# Patient Record
Sex: FEMALE
[De-identification: names, ages, dates, MRNs, and addresses within clinical notes are randomized per-mention and may not be internally consistent; named-entity substitution may affect disease eponyms.]

---

## 2019-12-10 PROBLEM — Z00.00 ENCOUNTER FOR PREVENTIVE HEALTH EXAMINATION: Status: ACTIVE | Noted: 2019-12-10

## 2019-12-11 ENCOUNTER — APPOINTMENT (OUTPATIENT)
Dept: RHEUMATOLOGY | Facility: CLINIC | Age: 34
End: 2019-12-11
Payer: MEDICAID

## 2019-12-11 VITALS
OXYGEN SATURATION: 96 % | BODY MASS INDEX: 27.29 KG/M2 | TEMPERATURE: 98.4 F | SYSTOLIC BLOOD PRESSURE: 119 MMHG | DIASTOLIC BLOOD PRESSURE: 72 MMHG | HEART RATE: 84 BPM | WEIGHT: 148.31 LBS | HEIGHT: 62 IN

## 2019-12-11 DIAGNOSIS — Z87.01 PERSONAL HISTORY OF PNEUMONIA (RECURRENT): ICD-10-CM

## 2019-12-11 DIAGNOSIS — Z82.49 FAMILY HISTORY OF ISCHEMIC HEART DISEASE AND OTHER DISEASES OF THE CIRCULATORY SYSTEM: ICD-10-CM

## 2019-12-11 DIAGNOSIS — R51 HEADACHE: ICD-10-CM

## 2019-12-11 DIAGNOSIS — R76.8 OTHER SPECIFIED ABNORMAL IMMUNOLOGICAL FINDINGS IN SERUM: ICD-10-CM

## 2019-12-11 DIAGNOSIS — Z83.3 FAMILY HISTORY OF DIABETES MELLITUS: ICD-10-CM

## 2019-12-11 DIAGNOSIS — M08.90 JUVENILE ARTHRITIS, UNSPECIFIED, UNSPECIFIED SITE: ICD-10-CM

## 2019-12-11 DIAGNOSIS — Z78.9 OTHER SPECIFIED HEALTH STATUS: ICD-10-CM

## 2019-12-11 DIAGNOSIS — Z80.9 FAMILY HISTORY OF MALIGNANT NEOPLASM, UNSPECIFIED: ICD-10-CM

## 2019-12-11 DIAGNOSIS — M25.50 PAIN IN UNSPECIFIED JOINT: ICD-10-CM

## 2019-12-11 PROCEDURE — 36415 COLL VENOUS BLD VENIPUNCTURE: CPT

## 2019-12-11 PROCEDURE — 99204 OFFICE O/P NEW MOD 45 MIN: CPT | Mod: 25

## 2019-12-11 RX ORDER — SERTRALINE HYDROCHLORIDE 25 MG/1
TABLET, FILM COATED ORAL
Refills: 0 | Status: ACTIVE | COMMUNITY

## 2019-12-11 RX ORDER — BUPROPION HYDROCHLORIDE 100 MG/1
TABLET, FILM COATED ORAL
Refills: 0 | Status: ACTIVE | COMMUNITY

## 2019-12-11 NOTE — HISTORY OF PRESENT ILLNESS
[FreeTextEntry1] : This is a 34-year-old woman she comes with her  she is concerned about fibromyalgia she's also been found to have a positive BRYANT she reports she has joint pain she has pains in her elbows her wrists and her fingers she has periods where she feels she is losing strength in her arms and hands when she is holding a book after while things become very painful and she has to let go she also has been having headaches migraine in nature she is bothered by light chills has dryness of her eyes nonetheless she is able to wear it contacts she uses eyedrops she is able to make tears her symptoms date back for over 20 years she also has lower extremity pain in the ankles she has chronic neck pain she reports that as a child 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 She is wondering whether or not she should be given Cymbalta at a different medicine. She denies any significant rashes or sun sensitive rashes she has had no involuntary weight loss she had been going to the gym in the past but stopped this about 2 months ago and apparently she was diagnosed as having mono she also has poor sleeping habits she is a tired she does not have any breathing problems no coughing or shortness of breath she has no GI problems no nausea or vomiting or diarrhea her. She's actually had no recent swelling of any joints she's had 3 pregnancies with anemia and aware of any significant family history of any arthritic issues.

## 2019-12-11 NOTE — ASSESSMENT
[FreeTextEntry1] : 3 for her woman long-standing history of musculoskeletal complaints dating back into childhood in fact at one point was told that she may have a juvenile form of arthritis. She recalls being treated with some medications and braces although she cannot recall specifics. She continues to have widespread pain shortness of a sleep disturbance she has headaches and migraines she is bothered by the light she has also had fatigue and poor sleep she does have a history of depression and anxiety has been tried on a number of different agents for this she does report that she is responding well to the combination of Wellbutrin and Zoloft but there is concern or wondering whether she should be switched to Cymbalta because of a question of fibromyalgia she does however have a positive BRYANT she has a low vitamin D. I did explain to her the BRYANT is nonspecific but with history of childhood arthritis a possibility additional serology should be done. I am sending an avise [facility] - I explained to her in fact there is no specific test for fibromyalgia it is a diagnosis of exclusion and antidepressants are commonly used high am uncertain as to whether Cymbalta we'll be any more effective or beneficial to her that her current regiment of Wellbutrin and Zoloft. I did discuss with her resuming exercise. Which may also help with her musculoskeletal complaints.

## 2019-12-11 NOTE — PHYSICAL EXAM
[General Appearance - Alert] : alert [General Appearance - Well Developed] : well developed [General Appearance - Well Nourished] : well nourished [General Appearance - In No Acute Distress] : in no acute distress [Sclera] : the sclera and conjunctiva were normal [Examination Of The Oral Cavity] : the lips and gums were normal [PERRL With Normal Accommodation] : pupils were equal in size, round, and reactive to light [Oropharynx] : the oropharynx was normal [Neck Appearance] : the appearance of the neck was normal [Thyroid Diffuse Enlargement] : the thyroid was not enlarged [Heart Rate And Rhythm] : heart rate was normal and rhythm regular [Respiration, Rhythm And Depth] : normal respiratory rhythm and effort [Auscultation Breath Sounds / Voice Sounds] : lungs were clear to auscultation bilaterally [Heart Sounds] : normal S1 and S2 [Murmurs] : no murmurs [No CVA Tenderness] : no ~M costovertebral angle tenderness [Edema] : there was no peripheral edema [Veins - Varicosity Changes] : there were no varicosital changes [Abnormal Walk] : normal gait [Musculoskeletal - Swelling] : no joint swelling seen [No Spinal Tenderness] : no spinal tenderness [Skin Color & Pigmentation] : normal skin color and pigmentation [Motor Tone] : muscle strength and tone were normal [Skin Turgor] : normal skin turgor [Deep Tendon Reflexes (DTR)] : deep tendon reflexes were 2+ and symmetric [] : no rash [Cranial Nerves] : cranial nerves 2-12 were intact [No Focal Deficits] : no focal deficits [FreeTextEntry1] : No active synovitis no tenderness swollen joints all normal per

## 2019-12-11 NOTE — REVIEW OF SYSTEMS
[Feeling Poorly] : feeling poorly [Feeling Tired] : feeling tired [Dry Eyes] : dryness of the eyes [Arthralgias] : arthralgias [Joint Pain] : joint pain [Limb Pain] : limb pain [Joint Stiffness] : joint stiffness [Muscle Weakness] : muscle weakness [Feelings Of Weakness] : feelings of weakness [Fever] : no fever [Red Eyes] : eyes not red [Chills] : no chills [Eye Pain] : no eye pain [Sore Throat] : no sore throat [Hoarseness] : no hoarseness [Chest Pain] : no chest pain [Palpitations] : no palpitations [Shortness Of Breath] : no shortness of breath [Wheezing] : no wheezing [Cough] : no cough [SOB on Exertion] : no shortness of breath during exertion [Vomiting] : no vomiting [Abdominal Pain] : no abdominal pain [Diarrhea] : no diarrhea [Constipation] : no constipation [Heartburn] : no heartburn [Melena] : no melena [Joint Swelling] : no joint swelling [Limb Swelling] : no limb swelling [Itching] : no itching [Skin Lesions] : no skin lesions [Fainting] : no fainting [Dizziness] : no dizziness [Easy Bleeding] : no tendency for easy bleeding [Easy Bruising] : no tendency for easy bruising [FreeTextEntry4] : No dry mouth there is a history of canker sores

## 2019-12-11 NOTE — DATA REVIEWED
[FreeTextEntry1] : Laboratory studies that were done were provided to me there is a positive BRYANT of 1-320. Negative double-stranded DNA CRP is negative liver function studies are negative vitamin D is low at 20\par Serologies for BC serologies and Bartonella serologies are all normal or negative thyroid studies TSH are normal.

## 2019-12-20 LAB
MISCELLANEOUS TEST: NORMAL
PROC NAME: NORMAL